# Patient Record
Sex: FEMALE | Race: WHITE | NOT HISPANIC OR LATINO | Employment: FULL TIME | URBAN - METROPOLITAN AREA
[De-identification: names, ages, dates, MRNs, and addresses within clinical notes are randomized per-mention and may not be internally consistent; named-entity substitution may affect disease eponyms.]

---

## 2021-09-28 PROBLEM — F41.8 ANXIETY WITH DEPRESSION: Chronic | Status: ACTIVE | Noted: 2021-09-28

## 2021-09-28 PROBLEM — R73.03 PREDIABETES: Chronic | Status: ACTIVE | Noted: 2021-09-28

## 2021-09-28 PROBLEM — I10 ESSENTIAL HYPERTENSION: Chronic | Status: ACTIVE | Noted: 2021-09-28

## 2021-09-28 PROBLEM — E03.9 ACQUIRED HYPOTHYROIDISM: Chronic | Status: ACTIVE | Noted: 2021-09-28

## 2021-09-29 PROBLEM — Z86.2 HX OF IRON DEFICIENCY ANEMIA: Status: ACTIVE | Noted: 2021-09-29

## 2021-09-29 PROBLEM — G47.00 INSOMNIA: Chronic | Status: ACTIVE | Noted: 2021-09-29

## 2021-09-29 PROBLEM — K21.9 GASTROESOPHAGEAL REFLUX DISEASE: Chronic | Status: ACTIVE | Noted: 2021-09-29

## 2021-12-02 ENCOUNTER — PATIENT OUTREACH (OUTPATIENT)
Dept: ADMINISTRATIVE | Facility: HOSPITAL | Age: 61
End: 2021-12-02

## 2021-12-02 PROBLEM — L21.9 SEBORRHEA: Status: ACTIVE | Noted: 2021-12-02

## 2021-12-02 PROBLEM — J30.0 VASOMOTOR RHINITIS: Status: ACTIVE | Noted: 2021-12-02

## 2021-12-02 PROBLEM — L81.4 LENTIGO: Chronic | Status: ACTIVE | Noted: 2021-12-02

## 2022-02-02 PROBLEM — H35.30 AGE-RELATED MACULAR DEGENERATION: Status: ACTIVE | Noted: 2022-02-02

## 2022-02-02 PROBLEM — H35.033 HYPERTENSIVE RETINOPATHY OF BOTH EYES: Status: ACTIVE | Noted: 2022-02-02

## 2022-02-02 PROBLEM — H25.813 COMBINED FORM OF AGE-RELATED CATARACT, BOTH EYES: Status: ACTIVE | Noted: 2022-02-02

## 2022-03-06 PROBLEM — D50.8 IRON DEFICIENCY ANEMIA SECONDARY TO INADEQUATE DIETARY IRON INTAKE: Chronic | Status: ACTIVE | Noted: 2022-03-06

## 2022-04-24 PROBLEM — R68.82 DECREASED LIBIDO: Status: ACTIVE | Noted: 2022-04-24

## 2022-05-23 PROBLEM — J01.90 ACUTE SINUSITIS: Status: ACTIVE | Noted: 2020-01-27

## 2022-05-23 PROBLEM — Z85.3 HISTORY OF BREAST CANCER: Status: ACTIVE | Noted: 2022-05-23

## 2022-06-13 PROBLEM — E11.9 CONTROLLED TYPE 2 DIABETES MELLITUS, WITHOUT LONG-TERM CURRENT USE OF INSULIN: Status: ACTIVE | Noted: 2021-09-28

## 2022-06-21 PROBLEM — H25.811 COMBINED FORM OF AGE-RELATED CATARACT, RIGHT EYE: Status: ACTIVE | Noted: 2022-06-21

## 2022-08-22 PROBLEM — J01.90 ACUTE SINUSITIS: Status: RESOLVED | Noted: 2020-01-27 | Resolved: 2022-08-22

## 2022-08-29 LAB
LEFT EYE DM RETINOPATHY: POSITIVE
RIGHT EYE DM RETINOPATHY: POSITIVE

## 2022-09-16 ENCOUNTER — PATIENT OUTREACH (OUTPATIENT)
Dept: ADMINISTRATIVE | Facility: HOSPITAL | Age: 62
End: 2022-09-16
Payer: COMMERCIAL

## 2022-12-14 PROBLEM — E78.5 HYPERLIPIDEMIA: Status: ACTIVE | Noted: 2022-12-14

## 2023-04-29 PROBLEM — J30.9 ALLERGIC RHINITIS: Status: ACTIVE | Noted: 2023-04-29

## 2023-06-29 ENCOUNTER — PATIENT OUTREACH (OUTPATIENT)
Dept: ADMINISTRATIVE | Facility: HOSPITAL | Age: 63
End: 2023-06-29

## 2023-06-29 DIAGNOSIS — Z12.11 SCREEN FOR COLON CANCER: Primary | ICD-10-CM

## 2023-11-20 LAB — NONINV COLON CA DNA+OCC BLD SCRN STL QL: NEGATIVE

## 2024-07-08 ENCOUNTER — PATIENT OUTREACH (OUTPATIENT)
Dept: ADMINISTRATIVE | Facility: HOSPITAL | Age: 64
End: 2024-07-08

## 2024-07-08 DIAGNOSIS — E11.9 CONTROLLED TYPE 2 DIABETES MELLITUS WITHOUT COMPLICATION, WITHOUT LONG-TERM CURRENT USE OF INSULIN: Primary | ICD-10-CM

## 2024-07-09 ENCOUNTER — PATIENT OUTREACH (OUTPATIENT)
Dept: ADMINISTRATIVE | Facility: HOSPITAL | Age: 64
End: 2024-07-09

## 2024-07-17 PROBLEM — L81.4 LENTIGO: Chronic | Status: RESOLVED | Noted: 2021-12-02 | Resolved: 2024-07-17

## 2024-07-17 PROBLEM — Z86.2 HX OF IRON DEFICIENCY ANEMIA: Status: RESOLVED | Noted: 2021-09-29 | Resolved: 2024-07-17

## 2024-07-17 PROBLEM — D50.8 IRON DEFICIENCY ANEMIA SECONDARY TO INADEQUATE DIETARY IRON INTAKE: Chronic | Status: RESOLVED | Noted: 2022-03-06 | Resolved: 2024-07-17

## 2024-07-17 PROBLEM — R68.82 DECREASED LIBIDO: Status: RESOLVED | Noted: 2022-04-24 | Resolved: 2024-07-17

## 2024-07-17 PROBLEM — L21.9 SEBORRHEA: Status: RESOLVED | Noted: 2021-12-02 | Resolved: 2024-07-17

## 2024-07-17 PROBLEM — Z85.3 HISTORY OF BREAST CANCER: Status: RESOLVED | Noted: 2022-05-23 | Resolved: 2024-07-17

## 2024-09-20 ENCOUNTER — SOCIAL WORK (OUTPATIENT)
Dept: ADMINISTRATIVE | Facility: OTHER | Age: 64
End: 2024-09-20

## 2024-09-20 NOTE — PROGRESS NOTES
Sw received a referral to assist with pain management. [facility] had received a consult to see Patient. However, the Clinic does not have a physician licensed to prescribe Suboxone. Sw mailed Patient a letter explaining this. She was encouraged to contact her insurance provider for assistance in locating a provider that does prescribe the medicine.    Vnaia Awan LCSW    [phone]

## 2024-10-29 PROBLEM — R87.810 HUMAN PAPILLOMAVIRUS (HPV) TYPE 18 DNA DETECTED IN CERVICAL SPECIMEN: Status: ACTIVE | Noted: 2024-10-29

## 2024-11-13 PROBLEM — C51.9 VULVAR CANCER: Status: ACTIVE | Noted: 2024-11-13

## 2024-12-13 PROBLEM — Z90.79 H/O VULVECTOMY: Status: ACTIVE | Noted: 2024-12-13

## 2025-02-02 ENCOUNTER — ON-DEMAND VIRTUAL (OUTPATIENT)
Dept: URGENT CARE | Facility: CLINIC | Age: 65
End: 2025-02-02
Payer: COMMERCIAL

## 2025-02-02 DIAGNOSIS — J01.90 ACUTE BACTERIAL SINUSITIS: Primary | ICD-10-CM

## 2025-02-02 DIAGNOSIS — R05.9 COUGH, UNSPECIFIED TYPE: ICD-10-CM

## 2025-02-02 DIAGNOSIS — B96.89 ACUTE BACTERIAL SINUSITIS: Primary | ICD-10-CM

## 2025-02-02 PROCEDURE — 98005 SYNCH AUDIO-VIDEO EST LOW 20: CPT | Mod: 95,,, | Performed by: NURSE PRACTITIONER

## 2025-02-02 RX ORDER — AMOXICILLIN 875 MG/1
875 TABLET, FILM COATED ORAL EVERY 12 HOURS
Qty: 14 TABLET | Refills: 0 | Status: SHIPPED | OUTPATIENT
Start: 2025-02-02 | End: 2025-02-04

## 2025-02-02 RX ORDER — BENZONATATE 100 MG/1
100 CAPSULE ORAL 3 TIMES DAILY PRN
Qty: 30 CAPSULE | Refills: 0 | Status: SHIPPED | OUTPATIENT
Start: 2025-02-02 | End: 2025-02-04

## 2025-02-02 RX ORDER — FLUTICASONE PROPIONATE 50 MCG
1 SPRAY, SUSPENSION (ML) NASAL DAILY
Qty: 9.9 ML | Refills: 0 | Status: SHIPPED | OUTPATIENT
Start: 2025-02-02 | End: 2025-03-04

## 2025-02-02 RX ORDER — LORATADINE 10 MG/1
10 TABLET ORAL DAILY
Qty: 30 TABLET | Refills: 0 | Status: SHIPPED | OUTPATIENT
Start: 2025-02-02 | End: 2025-02-04

## 2025-02-02 NOTE — PATIENT INSTRUCTIONS
Increase fluids and rest  Pedialyte, powerade, gatorade  Take meds as directed  Warm salt water gargles, tea with honey, chlorseptic spray, throat lozenges   Limit foods that are salty, spicy food, limit carbonated drinks, limit acidic drinks  Tylenol or Motrin as directed for fever or body aches  Continue antihistamine (zyrtec or Claritin), Flonase and saline nasal spray  Okay to take Robitussin DM, Mucinex DM, Dayquil/Nyquil as directed    If increased Shortness of breath or difficulty breathing go to the Urgent Care or ER  If symptoms worsening or not improved f/u in Urgent Care or with PCP

## 2025-02-02 NOTE — PROGRESS NOTES
Subjective:      Patient ID: Su Murillo is a 64 y.o. female.    Vitals:  vitals were not taken for this visit.     Chief Complaint: Sinus Problem (X 8-9 days) and Cough      Visit Type: TELE AUDIOVISUAL    Patient Location: [place], [place].     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    Acid reflux     Acquired hypothyroidism 09/28/2021    Age-related macular degeneration 02/02/2022    Anxiety     Breast cancer 2012    chemo/radiation after R mastectomy    Breast cancer 2012    Combined form of age-related cataract, both eyes 02/02/2022    Depression     Diabetes mellitus     Encounter for blood transfusion     History of breast cancer 05/23/2022    Hx of iron deficiency anemia 09/29/2021    Hypertension      Past Surgical History:   Procedure Laterality Date    ADENOIDECTOMY  1976    BREAST BIOPSY Left 2024    BREAST RECONSTRUCTION Right     CATARACT EXTRACTION      CHOLECYSTECTOMY      MASTECTOMY Right 2012    TONSILLECTOMY      VULVECTOMY N/A 12/13/2024    Procedure: VULVECTOMY;  Surgeon: Shankar Mercer MD;  Location: [facility] MAIN OR;  Service: OB/GYN;  Laterality: N/A;     Review of patient's allergies indicates:  No Known Allergies  Current Outpatient Medications on File Prior to Visit   Medication Sig Dispense Refill    atorvastatin (LIPITOR) 20 MG tablet Take 1 tablet (20 mg total) by mouth once daily. For cholesterol 90 tablet 3    azelastine (ASTELIN) 137 mcg (0.1 %) nasal spray 1 spray (137 mcg total) by Nasal route 2 (two) times daily. 30 mL 6    blood-glucose sensor Nicolette 1 each by Misc.(Non-Drug; Combo Route) route continuous. 1 each 0    buprenorphine HCL (SUBUTEX) 2 mg Subl Place 2 mg under the tongue once daily.      DULoxetine (CYMBALTA) 60 MG capsule Take 1 capsule (60 mg total) by mouth once daily. 90 capsule 3    ferrous sulfate  (FEOSOL) 325 mg (65 mg iron) Tab tablet TAKE ONE TABLET  BY MOUTH ONCE DAILY WITH BREAKFAST 90 tablet 3    levothyroxine (SYNTHROID) 75 MCG tablet Take 1 tablet (75 mcg total) by mouth before breakfast. 90 tablet 3    LORazepam (ATIVAN) 1 MG tablet Take 1 tablet (1 mg total) by mouth every 6 (six) hours as needed for Anxiety. 15 tablet 2    metoprolol tartrate (LOPRESSOR) 50 MG tablet Take 1 tablet (50 mg total) by mouth 2 (two) times daily. 180 tablet 3    oxyCODONE (ROXICODONE) 5 MG immediate release tablet Take 1 tablet (5 mg total) by mouth every 6 (six) hours as needed (pain). 15 tablet 0    pantoprazole (PROTONIX) 40 MG tablet Take 1 tablet (40 mg total) by mouth once daily. 90 tablet 3    tirzepatide (MOUNJARO) 5 mg/0.5 mL PnIj Inject 5 mg into the skin every 7 days. 2 mL 2    tretinoin (RETIN-A) 0.025 % cream Apply topically every evening. 45 g 3    TRUEPLUS LANCETS 30 gauge Misc USE TO CHECK BLOOD GLUCOSE LEVEL TWICE A DAY      zolpidem (AMBIEN) 10 mg Tab Take 1 tablet (10 mg total) by mouth nightly as needed (insomnia). 30 tablet 0     No current facility-administered medications on file prior to visit.     Family History   Problem Relation Name Age of Onset    Arthritis Mother Myah Walter     Hypertension Mother Myah Walter     Alcohol abuse Father Claude WInkler     Diabetes Father Claude WInkler     Early death Father Claude WInkler         66    Hearing loss Father Claude WInkler     Glaucoma Sister Monae Meier     Depression Sister Monae Meier     Kidney disease Sister Monae Meier     Diabetes type II Sister Monae Meier     Hypertension Brother      COPD Maternal Grandmother      Lung cancer Maternal Grandfather      Stroke Paternal Grandmother      Stomach cancer Paternal Grandfather      No Known Problems Maternal Aunt      No Known Problems Maternal Uncle      No Known Problems Paternal Aunt      No Known Problems Paternal Uncle      Anesthesia problems Neg Hx      Broken bones Neg Hx       Cancer Neg Hx      Clotting disorder Neg Hx      Collagen disease Neg Hx      Dislocations Neg Hx      Osteoporosis Neg Hx      Rheumatologic disease Neg Hx      Scoliosis Neg Hx      Severe sprains Neg Hx      Amblyopia Neg Hx      Blindness Neg Hx      Cataracts Neg Hx      Macular degeneration Neg Hx      Retinal detachment Neg Hx      Strabismus Neg Hx      Breast cancer Neg Hx      Ovarian cancer Neg Hx         Medications Ordered                [id number]  - [place] - [place], [place] - [address]   [address] [facility] [address]    Telephone: [phone]   Fax: [phone]   Hours: Not open 24 hours                         E-Prescribed (4 of 4)              amoxicillin (AMOXIL) 875 MG tablet    Sig: Take 1 tablet (875 mg total) by mouth every 12 (twelve) hours. for 7 days       Start: 25     Quantity: 14 tablet Refills: 0                         benzonatate (TESSALON) 100 MG capsule    Sig: Take 1 capsule (100 mg total) by mouth 3 (three) times daily as needed for Cough.       Start: 25     Quantity: 30 capsule Refills: 0                         fluticasone propionate (FLONASE) 50 mcg/actuation nasal spray    Si spray (50 mcg total) by Each Nostril route once daily.       Start: 25     Quantity: 9.9 mL Refills: 0                         loratadine (CLARITIN) 10 mg tablet    Sig: Take 1 tablet (10 mg total) by mouth once daily.       Start: 25     Quantity: 30 tablet Refills: 0                           Ohs Peq Odvv Intake    2025 11:37 AM CST - Filed by Patient   What is your current physical address in the event of a medical emergency? [address] [place] [address]   Are you able to take your vital signs? No   Please attach any relevant images or files    Is your employer contracted with [facility]? No         Pt presents with bodyaches, cough, congestion, and ha x 8 days. Reports taking OTC cough meds not helping.Denies any Fever, CP,  SOB, dizziness, abdominal pain.         Constitution: Positive for fatigue. Negative for fever.   HENT:  Positive for congestion, sinus pain and sinus pressure.    Cardiovascular:  Negative for chest pain.   Respiratory:  Positive for cough and sputum production. Negative for shortness of breath and wheezing.    Gastrointestinal:  Negative for abdominal pain.   Musculoskeletal:  Positive for muscle ache.   Neurological:  Positive for headaches. Negative for dizziness.        Objective:   The physical exam was conducted virtually.  Physical Exam   Constitutional: She is oriented to person, place, and time.   HENT:   Head: Normocephalic.   Ears:   Right Ear: External ear normal.   Left Ear: External ear normal.   Nose: Right sinus exhibits maxillary sinus tenderness and frontal sinus tenderness.   Eyes: Conjunctivae are normal.   Neck: Neck supple.   Pulmonary/Chest: Effort normal. No respiratory distress.   Neurological: She is alert and oriented to person, place, and time.       Assessment:     1. Acute bacterial sinusitis    2. Cough, unspecified type        Plan:       Acute bacterial sinusitis  -     amoxicillin (AMOXIL) 875 MG tablet; Take 1 tablet (875 mg total) by mouth every 12 (twelve) hours. for 7 days  Dispense: 14 tablet; Refill: 0  -     fluticasone propionate (FLONASE) 50 mcg/actuation nasal spray; 1 spray (50 mcg total) by Each Nostril route once daily.  Dispense: 9.9 mL; Refill: 0  -     loratadine (CLARITIN) 10 mg tablet; Take 1 tablet (10 mg total) by mouth once daily.  Dispense: 30 tablet; Refill: 0    Cough, unspecified type  -     benzonatate (TESSALON) 100 MG capsule; Take 1 capsule (100 mg total) by mouth 3 (three) times daily as needed for Cough.  Dispense: 30 capsule; Refill: 0    We appreciate you trusting us with your medical care. We hope you feel better soon. We will be happy to take care of you for all of your future medical needs.     You must understand that you've received Virtual  treatment only and that you may be released before all your medical problems are known or treated. You, the patient, will arrange for follow up care as instructed.     Follow up with your PCP or specialty clinic as directed in the next 1-2 weeks if not improved or as needed. You can call [phone] to schedule an appointment with the appropriate provider.     If your condition worsens we recommend that you receive another evaluation in person, with your primary care provider, urgent care or at the emergency room immediately or contact your primary medical clinics after hours call service to discuss your concerns.

## 2025-02-04 PROBLEM — E11.9 CONTROLLED TYPE 2 DIABETES MELLITUS WITHOUT COMPLICATION, WITHOUT LONG-TERM CURRENT USE OF INSULIN: Status: ACTIVE | Noted: 2025-02-04

## 2025-04-04 ENCOUNTER — ON-DEMAND VIRTUAL (OUTPATIENT)
Dept: URGENT CARE | Facility: CLINIC | Age: 65
End: 2025-04-04
Payer: COMMERCIAL

## 2025-04-04 DIAGNOSIS — N30.00 ACUTE CYSTITIS WITHOUT HEMATURIA: Primary | ICD-10-CM

## 2025-04-04 RX ORDER — SULFAMETHOXAZOLE AND TRIMETHOPRIM 800; 160 MG/1; MG/1
1 TABLET ORAL 2 TIMES DAILY
Qty: 10 TABLET | Refills: 0 | Status: SHIPPED | OUTPATIENT
Start: 2025-04-04 | End: 2025-04-09

## 2025-04-04 NOTE — PROGRESS NOTES
Subjective:      Patient ID: Su Murillo is a 64 y.o. female.    Vitals:  vitals were not taken for this visit.     Chief Complaint: Urinary Tract Infection      Visit Type: TELE AUDIOVISUAL - This visit was conducted virtually based on  subjective information and limited objective exam    Present with the patient at the time of consultation: TELEMED PRESENT WITH PATIENT: None  LOCATION OF PATIENT [place], [place]  Two patient identifiers used to verify patient- saying out date of birth and full name.       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 08/12/2016    Acid reflux     Acquired hypothyroidism 09/28/2021    Age-related macular degeneration 02/02/2022    Anxiety     Breast cancer 2012    chemo/radiation after R mastectomy    Breast cancer 2012    Combined form of age-related cataract, both eyes 02/02/2022    Depression     Diabetes mellitus     Encounter for blood transfusion     History of breast cancer 05/23/2022    Hx of iron deficiency anemia 09/29/2021    Hypertension      Past Surgical History:   Procedure Laterality Date    ADENOIDECTOMY  1976    BREAST BIOPSY Left 2024    BREAST RECONSTRUCTION Right     CATARACT EXTRACTION      CHOLECYSTECTOMY      MASTECTOMY Right 2012    TONSILLECTOMY      VULVECTOMY N/A 12/13/2024    Procedure: VULVECTOMY;  Surgeon: Shankar Mercer MD;  Location: [facility] MAIN OR;  Service: OB/GYN;  Laterality: N/A;     Review of patient's allergies indicates:  No Known Allergies  Medications Ordered Prior to Encounter[1]  Family History   Problem Relation Name Age of Onset    Arthritis Mother Myah Swansonkler     Hypertension Mother Myah Walter     Alcohol abuse Father Claude WInkler     Diabetes Father Claude WInkler     Early death Father Claude Saba         66    Hearing loss Father Claude WInkler     Glaucoma Sister Monae Meier     Depression Sister Monae Meier     Kidney disease Sister Monae Meier     Diabetes type II  Sister Monae Meier     Hypertension Brother      COPD Maternal Grandmother      Lung cancer Maternal Grandfather      Stroke Paternal Grandmother      Stomach cancer Paternal Grandfather      No Known Problems Maternal Aunt      No Known Problems Maternal Uncle      No Known Problems Paternal Aunt      No Known Problems Paternal Uncle      Anesthesia problems Neg Hx      Broken bones Neg Hx      Cancer Neg Hx      Clotting disorder Neg Hx      Collagen disease Neg Hx      Dislocations Neg Hx      Osteoporosis Neg Hx      Rheumatologic disease Neg Hx      Scoliosis Neg Hx      Severe sprains Neg Hx      Amblyopia Neg Hx      Blindness Neg Hx      Cataracts Neg Hx      Macular degeneration Neg Hx      Retinal detachment Neg Hx      Strabismus Neg Hx      Breast cancer Neg Hx      Ovarian cancer Neg Hx         Medications Ordered                [facility] [address] [facility], [place] - [address]   [address] [facility] [address]    Telephone: [phone]   Fax: [phone]   Hours: Not open 24 hours                         E-Prescribed (1 of 1)              sulfamethoxazole-trimethoprim 800-160mg (BACTRIM DS) 800-160 mg Tab    Sig: Take 1 tablet by mouth 2 (two) times daily. for 5 days       Start: 4/4/25     Quantity: 10 tablet Refills: 0                           Ohs Peq Odvv Intake    4/4/2025 11:25 AM CDT - Filed by Patient   What is your current physical address in the event of a medical emergency? [address] [place] [address]   Are you able to take your vital signs? Yes   Systolic Blood Pressure: 115   Diastolic Blood Pressure: 70   Weight: 148   Height: 64   Pulse: 68   Temperature: 97.8   Respiration rate: 76   Pulse Oxygen: 97   Please attach any relevant images or files    Is your employer contracted with [facility]? No         65 yo female c/o urinary frequency, urgency, decreased urine started yesterday.    Hx of UTI similar to past.   Denies dysuria, hematuria, flank,  fever, chills, N/V, vaginal discharge.         Constitution: Negative for activity change, appetite change, chills, fatigue, fever and generalized weakness.   Gastrointestinal:  Positive for nausea, vomiting, constipation and diarrhea. Negative for abdominal pain.   Genitourinary:  Positive for frequency, urgency and urine decreased. Negative for dysuria, flank pain, hematuria, vaginal pain, vaginal odor and pelvic pain.        Objective:   The physical exam was conducted virtually.    AAO x 3 ; no acute distress noted; appearance normal; mood and behavior normal; thought process normal  Head- normocephalic  Nose- appears normal, no discharge or erythema  Eyes- pupils appear normal in size, no drainage, no erythema  Ears- normal appearing; no discharge, no erythema  Mouth- appears normal  Oropharynx- no erythema, lesions  Lungs- breathing at a normal rate, no acute distress noted  Heart- no reports of tachycardia, palpitations, chest pain  Abdomen- non distended, non tender reported by patient  Skin- warm and dry, no erythema or edema noted by patient or visualized  Psych- as above; no si/hi      Assessment:     1. Acute cystitis without hematuria        Plan:         Thank you for choosing [facility]!    Our goal in the [facility] is to always provide outstanding medical care. You may receive a survey by mail or e-mail in the next week regarding your experience today. We would greatly appreciate you completing and returning the survey. Your feedback provides us with a way to recognize our staff who provide very good care, and it helps us learn how to improve when your experience was below our aspiration of excellence.         We appreciate you trusting us with your medical care. We hope you feel better soon. We will be happy to take care of you for all of your future medical needs.    You must understand that you've received an Urgent Care treatment only and that you may be  released before all your medical problems are known or treated. You, the patient, will arrange for follow up care as instructed.    Follow up with your PCP or specialty clinic as directed in the next 1-2 weeks if not improved or as needed.  You can call [phone] to schedule an appointment with the appropriate provider.    If your condition worsens we recommend that you receive another evaluation in person, with your primary care provider, urgent care or at the emergency room immediately or contact your primary medical clinics after hours call service to discuss your concerns.         Acute cystitis without hematuria  -     sulfamethoxazole-trimethoprim 800-160mg (BACTRIM DS) 800-160 mg Tab; Take 1 tablet by mouth 2 (two) times daily. for 5 days  Dispense: 10 tablet; Refill: 0                         [1]   Current Outpatient Medications on File Prior to Visit   Medication Sig Dispense Refill    ACCU-CHEK FASTCLIX LANCET DRUM Misc Apply topically 2 (two) times daily.      atorvastatin (LIPITOR) 20 MG tablet Take 1 tablet (20 mg total) by mouth once daily. For cholesterol 90 tablet 1    blood-glucose sensor (DEXCOM G7 SENSOR) Nicolette 1 each by Misc.(Non-Drug; Combo Route) route every 10 days. 3 each 11    buprenorphine HCL (SUBUTEX) 2 mg Subl Place 2 mg under the tongue once daily.      buprenorphine-naloxone 2-0.5 mg (SUBOXONE) 2-0.5 mg Film Place under the tongue.      DULoxetine (CYMBALTA) 60 MG capsule Take 1 capsule (60 mg total) by mouth once daily. 90 capsule 1    ferrous sulfate (FEOSOL) 325 mg (65 mg iron) Tab tablet Take 1 tablet (325 mg total) by mouth once daily. 90 tablet 1    levothyroxine (SYNTHROID) 75 MCG tablet Take 1 tablet (75 mcg total) by mouth before breakfast. 90 tablet 1    LORazepam (ATIVAN) 1 MG tablet Take 1 tablet (1 mg total) by mouth every 6 (six) hours as needed for Anxiety. 15 tablet 2    metoprolol tartrate (LOPRESSOR) 50 MG tablet Take 1 tablet (50 mg total) by mouth 2 (two) times  daily. 180 tablet 1    pantoprazole (PROTONIX) 40 MG tablet Take 1 tablet (40 mg total) by mouth once daily. 90 tablet 1    ramelteon (ROZEREM) 8 mg tablet Take 1 tablet (8 mg total) by mouth every evening. 90 tablet 1    tirzepatide (MOUNJARO) 5 mg/0.5 mL PnIj Inject 5 mg into the skin every 7 days. 2 mL 5    tretinoin (RETIN-A) 0.025 % cream Apply topically every evening. 45 g 3    zolpidem (AMBIEN) 10 mg Tab Take 1 tablet (10 mg total) by mouth nightly as needed (insomnia). 90 tablet 0     No current facility-administered medications on file prior to visit.

## 2025-04-14 ENCOUNTER — ON-DEMAND VIRTUAL (OUTPATIENT)
Dept: URGENT CARE | Facility: CLINIC | Age: 65
End: 2025-04-14
Payer: COMMERCIAL

## 2025-04-14 DIAGNOSIS — N30.00 ACUTE CYSTITIS WITHOUT HEMATURIA: Primary | ICD-10-CM

## 2025-04-14 PROCEDURE — 98005 SYNCH AUDIO-VIDEO EST LOW 20: CPT | Mod: 95,,, | Performed by: NURSE PRACTITIONER

## 2025-04-14 RX ORDER — CIPROFLOXACIN 250 MG/1
250 TABLET, FILM COATED ORAL 2 TIMES DAILY
Qty: 6 TABLET | Refills: 0 | Status: SHIPPED | OUTPATIENT
Start: 2025-04-14 | End: 2025-04-17

## 2025-04-14 NOTE — PROGRESS NOTES
Subjective:      Patient ID: Su Murillo is a 64 y.o. female.    Vitals:  vitals were not taken for this visit.     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 08/12/2016    Acid reflux     Acquired hypothyroidism 09/28/2021    Age-related macular degeneration 02/02/2022    Anxiety     Breast cancer 2012    chemo/radiation after R mastectomy    Breast cancer 2012    Combined form of age-related cataract, both eyes 02/02/2022    Depression     Diabetes mellitus     Encounter for blood transfusion     History of breast cancer 05/23/2022    Hx of iron deficiency anemia 09/29/2021    Hypertension      Past Surgical History:   Procedure Laterality Date    ADENOIDECTOMY  1976    BREAST BIOPSY Left 2024    BREAST RECONSTRUCTION Right     CATARACT EXTRACTION      CHOLECYSTECTOMY      MASTECTOMY Right 2012    TONSILLECTOMY      VULVECTOMY N/A 12/13/2024    Procedure: VULVECTOMY;  Surgeon: Shankar Mercer MD;  Location: [facility] MAIN OR;  Service: OB/GYN;  Laterality: N/A;     Review of patient's allergies indicates:  No Known Allergies  Medications Ordered Prior to Encounter[1]  Family History   Problem Relation Name Age of Onset    Arthritis Mother Myah Saba     Hypertension Mother Myah Swansonkler     Alcohol abuse Father Claude WInkler     Diabetes Father Claude WInkler     Early death Father Claude Saba         66    Hearing loss Father Claude WInkler     Glaucoma Sister Monae Meier     Depression Sister Monae Meier     Kidney disease Sister Monae Meier     Diabetes type II Sister Monae Meier     Hypertension Brother      COPD Maternal Grandmother      Lung cancer Maternal Grandfather      Stroke Paternal Grandmother      Stomach cancer Paternal Grandfather      No  Known Problems Maternal Aunt      No Known Problems Maternal Uncle      No Known Problems Paternal Aunt      No Known Problems Paternal Uncle      Anesthesia problems Neg Hx      Broken bones Neg Hx      Cancer Neg Hx      Clotting disorder Neg Hx      Collagen disease Neg Hx      Dislocations Neg Hx      Osteoporosis Neg Hx      Rheumatologic disease Neg Hx      Scoliosis Neg Hx      Severe sprains Neg Hx      Amblyopia Neg Hx      Blindness Neg Hx      Cataracts Neg Hx      Macular degeneration Neg Hx      Retinal detachment Neg Hx      Strabismus Neg Hx      Breast cancer Neg Hx      Ovarian cancer Neg Hx         Medications Ordered                [id number]  - [place] [id number] [facility]   [address] [facility] [address]    Telephone: [phone]   Fax: [phone]   Hours: Not open 24 hours                         E-Prescribed (1 of 1)              ciprofloxacin HCl (CIPRO) 250 MG tablet    Sig: Take 1 tablet (250 mg total) by mouth 2 (two) times daily. for 3 days       Start: 4/14/25     Quantity: 6 tablet Refills: 0                           Ohs Peq Odvv Intake    4/14/2025 10:29 AM CDT - Filed by Patient   What is your current physical address in the event of a medical emergency? [address]   Are you able to take your vital signs? No   Please attach any relevant images or files    Is your employer contracted with [facility]? No         Symptom of UTI x 2 days. Denies fever, blood in urine, back pain, dizziness, or ha.  Recent treatment for UTI noted with chart review: virtual visit on 4/4/2025  Pt admits to only taking medication for 2 days.     Urinary Tract Infection   This is a recurrent problem. The current episode started yesterday. The quality of the pain is described as aching. There has been no fever. Associated symptoms include frequency and urgency. Pertinent negatives include no flank pain, hematuria, nausea or vomiting.       Constitution:  Negative for fever.   Cardiovascular:  Negative for chest pain.   Respiratory:  Negative for shortness of breath.    Gastrointestinal:  Positive for abdominal pain. Negative for nausea and vomiting.   Genitourinary:  Positive for frequency, urgency and pelvic pain. Negative for dysuria, urine decreased, flank pain, bladder incontinence and hematuria.   Musculoskeletal:  Negative for back pain.   Neurological:  Negative for dizziness and headaches.        Objective:   The physical exam was conducted virtually.  Physical Exam   Constitutional: She is oriented to person, place, and time. No distress.   HENT:   Head: Normocephalic.   Ears:   Right Ear: External ear normal.   Left Ear: External ear normal.   Nose: No rhinorrhea or congestion.   Eyes: Conjunctivae are normal.   Neck: Neck supple.   Pulmonary/Chest: Effort normal. No respiratory distress.   Neurological: She is alert and oriented to person, place, and time.   Skin: Skin is no rash.       Assessment:     1. Acute cystitis without hematuria        Plan:   Increase fluids and rest,  Continue medications as prescribed  Take all antibiotics as directed  Wipe from front to back, limit bubble bath, limit use of thong under ware, void after intercourse  F/u with PCP in 2-3 days,   Go to Urgent Care or ER if symptoms worsening or not improved.    Acute cystitis without hematuria  -     ciprofloxacin HCl (CIPRO) 250 MG tablet; Take 1 tablet (250 mg total) by mouth 2 (two) times daily. for 3 days  Dispense: 6 tablet; Refill: 0      We appreciate you trusting us with your medical care. We hope you feel better soon. We will be happy to take care of you for all of your future medical needs.     You must understand that you've received Virtual treatment only and that you may be released before all your medical problems are known or treated. You, the patient, will arrange for follow up care as instructed.     Follow up with your PCP or specialty clinic as directed in the  next 1-2 weeks if not improved or as needed. You can call [phone] to schedule an appointment with the appropriate provider.     If your condition worsens we recommend that you receive another evaluation in person, with your primary care provider, urgent care or at the emergency room immediately or contact your primary medical clinics after hours call service to discuss your concerns.                   [1]   Current Outpatient Medications on File Prior to Visit   Medication Sig Dispense Refill    ACCU-CHEK FASTCLIX LANCET DRUM Misc Apply topically 2 (two) times daily.      atorvastatin (LIPITOR) 20 MG tablet Take 1 tablet (20 mg total) by mouth once daily. For cholesterol 90 tablet 1    blood-glucose sensor (DEXCOM G7 SENSOR) Nicolette 1 each by Misc.(Non-Drug; Combo Route) route every 10 days. 3 each 11    buprenorphine HCL (SUBUTEX) 2 mg Subl Place 2 mg under the tongue once daily.      buprenorphine-naloxone 2-0.5 mg (SUBOXONE) 2-0.5 mg Film Place under the tongue.      DULoxetine (CYMBALTA) 60 MG capsule Take 1 capsule (60 mg total) by mouth once daily. 90 capsule 1    ferrous sulfate (FEOSOL) 325 mg (65 mg iron) Tab tablet Take 1 tablet (325 mg total) by mouth once daily. 90 tablet 1    levothyroxine (SYNTHROID) 75 MCG tablet Take 1 tablet (75 mcg total) by mouth before breakfast. 90 tablet 1    LORazepam (ATIVAN) 1 MG tablet Take 1 tablet (1 mg total) by mouth every 6 (six) hours as needed for Anxiety. 15 tablet 2    metoprolol tartrate (LOPRESSOR) 50 MG tablet Take 1 tablet (50 mg total) by mouth 2 (two) times daily. 180 tablet 1    pantoprazole (PROTONIX) 40 MG tablet Take 1 tablet (40 mg total) by mouth once daily. 90 tablet 1    ramelteon (ROZEREM) 8 mg tablet Take 1 tablet (8 mg total) by mouth every evening. 90 tablet 1    tirzepatide (MOUNJARO) 5 mg/0.5 mL PnIj Inject 5 mg into the skin every 7 days. 2 mL 5    tretinoin (RETIN-A) 0.025 % cream Apply topically every evening. 45 g 3    zolpidem (AMBIEN)  10 mg Tab Take 1 tablet (10 mg total) by mouth nightly as needed (insomnia). 90 tablet 0     No current facility-administered medications on file prior to visit.

## 2025-04-14 NOTE — PATIENT INSTRUCTIONS

## 2025-08-15 ENCOUNTER — PATIENT OUTREACH (OUTPATIENT)
Dept: ADMINISTRATIVE | Facility: HOSPITAL | Age: 65
End: 2025-08-15
Payer: COMMERCIAL